# Patient Record
(demographics unavailable — no encounter records)

---

## 2025-02-10 NOTE — HISTORY OF PRESENT ILLNESS
[de-identified] : s/p ileostomy reversal June 2024 here today with c/o diarrhea.  Worst at night.  She is mostly fine during the day.  Tolerating low fiber and bland diet.  Otherwise doing well with good energy levels

## 2025-02-10 NOTE — ASSESSMENT
[FreeTextEntry1] : post ileostomy reversal diarrhea.  Instructed patient it can take months to yeas to normalize after sigmoid and small bowel resection.  advised on dietary modification.  can take Imodium as needed.  should follow up with GI and pcp.  Follow up as needed is diarrhea persists.

## 2025-02-10 NOTE — PHYSICAL EXAM
Assessment and Plan:     Problem List Items Addressed This Visit     None           Preventive health issues were discussed with patient, and age appropriate screening tests were ordered as noted in patient's After Visit Summary  Personalized health advice and appropriate referrals for health education or preventive services given if needed, as noted in patient's After Visit Summary       History of Present Illness:     Patient presents for Medicare Annual Wellness visit    Patient Care Team:  Vivien Hines as PCP - General (Family Medicine)     Problem List:     Patient Active Problem List   Diagnosis    Gastroesophageal reflux disease without esophagitis    Primary insomnia    Class 1 obesity in adult    Alopecia areata    Urinary frequency    Recurrent UTI    Incidental lung nodule, > 3mm and < 8mm    Loss of height    Post-menopausal    Age-related osteoporosis without current pathological fracture    Vitamin D deficiency      Past Medical and Surgical History:     Past Medical History:   Diagnosis Date    Age-related osteoporosis without current pathological fracture 7/25/2018    Class 1 obesity in adult 3/26/2018    Gastroesophageal reflux disease without esophagitis 3/26/2018    Hernia, hiatal     Insomnia     Osteoporosis     Recurrent UTI 5/3/2018    Stroke ([address] )     Ulcer, gastric, acute     Vitamin D deficiency      Past Surgical History:   Procedure Laterality Date    BREAST LUMPECTOMY Left     HERNIA REPAIR      umbilical hernia     TUBAL LIGATION        Family History:     Family History   Problem Relation Age of Onset    Diabetes Mother       Social History:     Social History     Socioeconomic History    Marital status: /Civil Union     Spouse name: None    Number of children: None    Years of education: None    Highest education level: None   Occupational History    None   Social Needs    Financial resource strain: None    Food insecurity:     Worry: None     Inability: None    Transportation needs:     Medical: None     Non-medical: None   Tobacco Use    Smoking status: Never Smoker    Smokeless tobacco: Never Used   Substance and Sexual Activity    Alcohol use: No    Drug use: No    Sexual activity: Never   Lifestyle    Physical activity:     Days per week: None     Minutes per session: None    Stress: None   Relationships    Social connections:     Talks on phone: None     Gets together: None     Attends Religion service: None     Active member of club or organization: None     Attends meetings of clubs or organizations: None     Relationship status: None    Intimate partner violence:     Fear of current or ex partner: None     Emotionally abused: None     Physically abused: None     Forced sexual activity: None   Other Topics Concern    None   Social History Narrative    None       Medications and Allergies:     Current Outpatient Medications   Medication Sig Dispense Refill    b complex vitamins tablet Take 1 tablet by mouth daily      calcium carbonate (OS-ROSA) 600 MG tablet Take 1 tablet (600 mg total) by mouth daily 90 tablet 5    calcium carbonate-vitamin D (OSCAL-D) 500 mg-200 units per tablet Take 1 tablet by mouth daily with breakfast      Collagen Hydrolysate POWD by Does not apply route      D-Mannose POWD Take by mouth      omega-3-acid ethyl esters (LOVAZA) 1 g capsule Take 2 g by mouth 2 (two) times a day      omeprazole (PriLOSEC) 20 mg delayed release capsule Take 1 capsule (20 mg total) by mouth daily 90 capsule 1    vitamin E 100 UNIT capsule Take 100 Units by mouth daily      ergocalciferol (VITAMIN D2) 50,000 units Take 1 capsule (50,000 Units total) by mouth once a week (Patient not taking: Reported on 12/18/2019) 12 capsule 0    gabapentin (NEURONTIN) 300 mg capsule Take 1 capsule (300 mg total) by mouth daily (Patient not taking: Reported on 12/18/2019) 30 capsule 3    trospium chloride (SANCTURA) 20 mg tablet Take 1 tablet (20 mg total) by mouth 2 (two) times a day (Patient not taking: Reported on 8/1/2019) 60 tablet 5     No current facility-administered medications for this visit  Allergies   Allergen Reactions    Ciprofloxacin Shortness Of Breath    Sulfa Antibiotics Shortness Of Breath     Stress and rash   Nitrofurantoin Nausea Only    Other Nausea Only     macrobid      Immunizations:     Immunization History   Administered Date(s) Administered    Influenza, high dose seasonal 0 5 mL 09/20/2018    Pneumococcal Conjugate 13-Valent 06/25/2018      Health Maintenance:         Topic Date Due    CRC Screening: FOBTx3/FIT  08/28/2001    MAMMOGRAM  07/17/2019    CRC Screening: Colonoscopy  12/05/2024    Hepatitis C Screening  Completed         Topic Date Due    DTaP,Tdap,and Td Vaccines (1 - Tdap) 08/28/1962    Pneumococcal Vaccine: 65+ Years (2 of 2 - PPSV23) 06/25/2019    Influenza Vaccine  07/01/2019      Medicare Health Risk Assessment:     /52 (BP Location: Left arm, Patient Position: Sitting, Cuff Size: Standard)   Pulse 64   Temp 97 9 °F (36 6 °C) (Temporal)   Resp 18   Ht 4' 7" (1 397 m)   Wt 64 kg (141 lb)   BMI 32 77 kg/m²      Cecil King is here for her Subsequent Wellness visit  Health Risk Assessment:   Patient rates overall health as good  Patient feels that their physical health rating is same  Eyesight was rated as slightly worse  Hearing was rated as much worse  Patient feels that their emotional and mental health rating is same  Pain experienced in the last 7 days has been none  Fall Risk Screening: In the past year, patient has experienced: no history of falling in past year      Urinary Incontinence Screening:   Patient has not leaked urine accidently in the last six months  Home Safety:  Patient does not have trouble with stairs inside or outside of their home  Patient has working smoke alarms and has no working carbon monoxide detector   Home safety hazards [JVD] : no jugular venous distention  [Normal Breath Sounds] : Normal breath sounds include: none  Nutrition:   Current diet is Regular  Medications:   Patient is currently taking over-the-counter supplements  OTC medications include: see medication list  Patient is able to manage medications  Activities of Daily Living (ADLs)/Instrumental Activities of Daily Living (IADLs):   Walk and transfer into and out of bed and chair?: Yes  Dress and groom yourself?: Yes    Bathe or shower yourself?: Yes    Feed yourself?  Yes  Do your laundry/housekeeping?: Yes  Manage your money, pay your bills and track your expenses?: Yes  Make your own meals?: Yes    Do your own shopping?: Yes    Advance Care Planning:   Living will: No      PREVENTIVE SCREENINGS      Cardiovascular Screening:    General: Screening Current      Diabetes Screening:     General: Screening Current      Colorectal Cancer Screening:     General: Screening Current      Breast Cancer Screening:     General: Screening Current      Cervical Cancer Screening:    General: Screening Not Indicated      Osteoporosis Screening:    General: Screening Not Indicated and History Osteoporosis      Hepatitis C Screening:    General: Screening Current      WAI Gentile [Normal Rate and Rhythm] : normal rate and rhythm [No Rash or Lesion] : No rash or lesion [Alert] : alert [Calm] : calm [de-identified] : soft, NT/ND, incisions well healed